# Patient Record
Sex: FEMALE | Race: WHITE | NOT HISPANIC OR LATINO | URBAN - METROPOLITAN AREA
[De-identification: names, ages, dates, MRNs, and addresses within clinical notes are randomized per-mention and may not be internally consistent; named-entity substitution may affect disease eponyms.]

---

## 2018-12-06 ENCOUNTER — IMPORTED ENCOUNTER (OUTPATIENT)
Dept: URBAN - METROPOLITAN AREA CLINIC 50 | Facility: CLINIC | Age: 63
End: 2018-12-06

## 2019-01-03 ENCOUNTER — IMPORTED ENCOUNTER (OUTPATIENT)
Dept: URBAN - METROPOLITAN AREA CLINIC 50 | Facility: CLINIC | Age: 64
End: 2019-01-03

## 2019-01-31 ENCOUNTER — IMPORTED ENCOUNTER (OUTPATIENT)
Dept: URBAN - METROPOLITAN AREA CLINIC 50 | Facility: CLINIC | Age: 64
End: 2019-01-31

## 2019-02-06 ENCOUNTER — IMPORTED ENCOUNTER (OUTPATIENT)
Dept: URBAN - METROPOLITAN AREA CLINIC 50 | Facility: CLINIC | Age: 64
End: 2019-02-06

## 2019-02-13 ENCOUNTER — IMPORTED ENCOUNTER (OUTPATIENT)
Dept: URBAN - METROPOLITAN AREA CLINIC 50 | Facility: CLINIC | Age: 64
End: 2019-02-13

## 2019-03-27 ENCOUNTER — IMPORTED ENCOUNTER (OUTPATIENT)
Dept: URBAN - METROPOLITAN AREA CLINIC 50 | Facility: CLINIC | Age: 64
End: 2019-03-27

## 2019-06-26 ENCOUNTER — IMPORTED ENCOUNTER (OUTPATIENT)
Dept: URBAN - METROPOLITAN AREA CLINIC 50 | Facility: CLINIC | Age: 64
End: 2019-06-26

## 2021-04-18 ASSESSMENT — TONOMETRY
OS_IOP_MMHG: 15
OS_IOP_MMHG: 15
OD_IOP_MMHG: 14
OD_IOP_MMHG: 14
OS_IOP_MMHG: 14
OD_IOP_MMHG: 15
OS_IOP_MMHG: 13
OD_IOP_MMHG: 14
OD_IOP_MMHG: 14
OS_IOP_MMHG: 15

## 2021-04-18 ASSESSMENT — VISUAL ACUITY
OD_PH: 20/30+
OS_SC: 20/40
OS_OTHER: 20/40. 20/50.
OS_CC: 20/30+2
OS_CC: 20/40
OD_CC: 20/30-1
OD_OTHER: 20/40. 20/50.
OD_CC: 20/30+1
OS_PH: 20/30+
OS_CC: 20/30
OD_BAT: 20/40
OD_SC: 20/40
OS_CC: 20/30
OD_CC: 20/30
OD_CC: 20/30+2
OS_BAT: 20/40

## 2021-12-20 ENCOUNTER — COMPREHENSIVE EXAM (OUTPATIENT)
Dept: URBAN - METROPOLITAN AREA CLINIC 48 | Facility: CLINIC | Age: 66
End: 2021-12-20

## 2021-12-20 DIAGNOSIS — H40.012: ICD-10-CM

## 2021-12-20 DIAGNOSIS — H25.13: ICD-10-CM

## 2021-12-20 DIAGNOSIS — H43.811: ICD-10-CM

## 2021-12-20 DIAGNOSIS — H16.223: ICD-10-CM

## 2021-12-20 PROCEDURE — 92014 COMPRE OPH EXAM EST PT 1/>: CPT

## 2021-12-20 ASSESSMENT — VISUAL ACUITY
OS_GLARE: 20/60
OD_SC: 20/40
OU_SC: J2
OD_PH: 20/30-1
OS_GLARE: 20/50
OS_SC: 20/30-2
OD_GLARE: 20/50
OD_GLARE: 20/40

## 2021-12-20 ASSESSMENT — TONOMETRY
OS_IOP_MMHG: 15
OD_IOP_MMHG: 15

## 2021-12-20 NOTE — PATIENT DISCUSSION
GLAUCOMA SUSPECT-C/D: The patient is a glaucoma suspect because of suspicious appearance of the optic disc(s). The IOP is in the target range 15/15 WNL. Patient denies any knowledge of family hx. Schedule RNFL/HVF/Pach at patient's convenience.

## 2021-12-20 NOTE — PATIENT DISCUSSION
Recommended patient to use PFAT 2-3x/day. Start doing warm compresses in the evening for 10 minutes at a time in the evening. Patient previously had Lipiflow in 2019 and is interested in doing it again. Discussed side affects of Lipiflow. Patient can schedule at her convenience.

## 2022-02-24 ENCOUNTER — CLINIC PROCEDURE ONLY (OUTPATIENT)
Dept: URBAN - METROPOLITAN AREA CLINIC 53 | Facility: CLINIC | Age: 67
End: 2022-02-24

## 2022-02-24 DIAGNOSIS — H16.223: ICD-10-CM

## 2022-02-24 PROCEDURE — 0207T CLEAR EYELID GLAND W/HEAT: CPT

## 2022-02-24 NOTE — PROCEDURE NOTE: CLINICAL
PROCEDURE NOTE: LipiFlow All 4 Lids. Diagnosis: Keratoconjunctivitis Sicca, Not Specified As Sjögren's. Procedure explained, patient verbalized understanding. Questions answered. consent/ABN signed. Activators fit well. I discussed importance of WC and blinking sequences for success. Procedure tolerated well. Patient sent with instructions and med list..

## 2022-04-06 ENCOUNTER — FOLLOW UP (OUTPATIENT)
Dept: URBAN - METROPOLITAN AREA CLINIC 53 | Facility: CLINIC | Age: 67
End: 2022-04-06

## 2022-04-06 DIAGNOSIS — H16.223: ICD-10-CM

## 2022-04-06 PROCEDURE — 92012 INTRM OPH EXAM EST PATIENT: CPT

## 2022-04-06 ASSESSMENT — VISUAL ACUITY
OS_SC: 20/30+2
OD_SC: 20/30

## 2022-04-06 ASSESSMENT — TONOMETRY
OD_IOP_MMHG: 15
OS_IOP_MMHG: 15

## 2022-04-06 NOTE — PATIENT DISCUSSION
Continue PFAT 2-3x/day. Continue warm compresses in the evening for 10 minutes at a time in the evening. Start gel drops or ointment OU Qhs. Patient has previously used Restasis x many years ago, has not used [facility] Spanmax.

## 2022-04-06 NOTE — PATIENT DISCUSSION
S/P lipiflow. Patient doing well, minimal improvement. Discussed with patient to continue OTC drops.

## 2023-08-24 ENCOUNTER — COMPREHENSIVE EXAM (OUTPATIENT)
Dept: URBAN - METROPOLITAN AREA CLINIC 53 | Facility: CLINIC | Age: 68
End: 2023-08-24

## 2023-08-24 DIAGNOSIS — H40.012: ICD-10-CM

## 2023-08-24 DIAGNOSIS — H43.811: ICD-10-CM

## 2023-08-24 DIAGNOSIS — H16.223: ICD-10-CM

## 2023-08-24 DIAGNOSIS — H52.4: ICD-10-CM

## 2023-08-24 DIAGNOSIS — H25.13: ICD-10-CM

## 2023-08-24 PROCEDURE — 92015 DETERMINE REFRACTIVE STATE: CPT

## 2023-08-24 PROCEDURE — 92133 CPTRZD OPH DX IMG PST SGM ON: CPT

## 2023-08-24 PROCEDURE — 92014 COMPRE OPH EXAM EST PT 1/>: CPT

## 2023-08-24 ASSESSMENT — VISUAL ACUITY
OS_SC: 20/30-1
OD_PH: 20/25
OD_GLARE: 20/50
OD_GLARE: 20/40
OS_PH: 20/20
OU_CC: J1+
OS_GLARE: 20/30
OD_SC: 20/30
OS_GLARE: 20/40
OU_SC: 20/25-1

## 2023-08-24 ASSESSMENT — TONOMETRY
OS_IOP_MMHG: 16
OD_IOP_MMHG: 15

## 2023-12-08 ENCOUNTER — FOLLOW UP (OUTPATIENT)
Dept: URBAN - METROPOLITAN AREA CLINIC 53 | Facility: CLINIC | Age: 68
End: 2023-12-08

## 2023-12-08 DIAGNOSIS — H40.012: ICD-10-CM

## 2023-12-08 PROCEDURE — 99213 OFFICE O/P EST LOW 20 MIN: CPT

## 2023-12-08 PROCEDURE — 92083 EXTENDED VISUAL FIELD XM: CPT

## 2023-12-08 PROCEDURE — 92133 CPTRZD OPH DX IMG PST SGM ON: CPT

## 2023-12-08 ASSESSMENT — TONOMETRY
OS_IOP_MMHG: 15
OD_IOP_MMHG: 15

## 2023-12-08 ASSESSMENT — VISUAL ACUITY
OS_SC: 20/30-2
OD_PH: 20/25-2
OD_SC: 20/40+2

## 2024-08-05 ENCOUNTER — COMPREHENSIVE EXAM (OUTPATIENT)
Dept: URBAN - METROPOLITAN AREA CLINIC 53 | Facility: CLINIC | Age: 69
End: 2024-08-05

## 2024-08-05 DIAGNOSIS — H43.811: ICD-10-CM

## 2024-08-05 DIAGNOSIS — H25.13: ICD-10-CM

## 2024-08-05 DIAGNOSIS — H40.012: ICD-10-CM

## 2024-08-05 DIAGNOSIS — H16.223: ICD-10-CM

## 2024-08-05 PROCEDURE — 99214 OFFICE O/P EST MOD 30 MIN: CPT

## 2024-08-05 ASSESSMENT — VISUAL ACUITY
OS_GLARE: 20/30
OS_PH: 20/30-1
OD_PH: 20/30-2
OS_GLARE: 20/30
OU_SC: J3
OD_SC: 20/40-1
OD_GLARE: 20/50
OS_SC: 20/40-1
OD_GLARE: 20/40

## 2024-08-05 ASSESSMENT — TONOMETRY
OD_IOP_MMHG: 18
OS_IOP_MMHG: 18

## 2025-08-11 ENCOUNTER — COMPREHENSIVE EXAM (OUTPATIENT)
Age: 70
End: 2025-08-11

## 2025-08-11 DIAGNOSIS — H25.13: ICD-10-CM

## 2025-08-11 DIAGNOSIS — H40.012: ICD-10-CM

## 2025-08-11 DIAGNOSIS — D17.0: ICD-10-CM

## 2025-08-11 DIAGNOSIS — H52.4: ICD-10-CM

## 2025-08-11 DIAGNOSIS — H43.811: ICD-10-CM

## 2025-08-11 DIAGNOSIS — H16.223: ICD-10-CM

## 2025-08-11 DIAGNOSIS — H33.321: ICD-10-CM

## 2025-08-11 PROCEDURE — 99214 OFFICE O/P EST MOD 30 MIN: CPT

## 2025-08-11 PROCEDURE — 92015 DETERMINE REFRACTIVE STATE: CPT
